# Patient Record
Sex: FEMALE | Race: AMERICAN INDIAN OR ALASKA NATIVE
[De-identification: names, ages, dates, MRNs, and addresses within clinical notes are randomized per-mention and may not be internally consistent; named-entity substitution may affect disease eponyms.]

---

## 2019-09-08 ENCOUNTER — HOSPITAL ENCOUNTER (EMERGENCY)
Dept: HOSPITAL 5 - ED | Age: 29
Discharge: LEFT BEFORE DISCHARGE | End: 2019-09-08
Payer: SELF-PAY

## 2019-09-08 ENCOUNTER — HOSPITAL ENCOUNTER (EMERGENCY)
Dept: HOSPITAL 5 - ED | Age: 29
Discharge: HOME | End: 2019-09-08
Payer: COMMERCIAL

## 2019-09-08 VITALS — SYSTOLIC BLOOD PRESSURE: 119 MMHG | DIASTOLIC BLOOD PRESSURE: 70 MMHG

## 2019-09-08 VITALS — SYSTOLIC BLOOD PRESSURE: 130 MMHG | DIASTOLIC BLOOD PRESSURE: 70 MMHG

## 2019-09-08 DIAGNOSIS — K80.70: Primary | ICD-10-CM

## 2019-09-08 DIAGNOSIS — R10.9: Primary | ICD-10-CM

## 2019-09-08 DIAGNOSIS — Z53.21: ICD-10-CM

## 2019-09-08 DIAGNOSIS — Z79.899: ICD-10-CM

## 2019-09-08 DIAGNOSIS — R11.2: ICD-10-CM

## 2019-09-08 LAB
ALBUMIN SERPL-MCNC: 4.6 G/DL (ref 3.9–5)
ALT SERPL-CCNC: 155 UNITS/L (ref 7–56)
BASOPHILS # (AUTO): 0 K/MM3 (ref 0–0.1)
BASOPHILS NFR BLD AUTO: 1.1 % (ref 0–1.8)
BUN SERPL-MCNC: 14 MG/DL (ref 7–17)
BUN/CREAT SERPL: 18 %
CALCIUM SERPL-MCNC: 10 MG/DL (ref 8.4–10.2)
EOSINOPHIL # BLD AUTO: 0.2 K/MM3 (ref 0–0.4)
EOSINOPHIL NFR BLD AUTO: 3.7 % (ref 0–4.3)
HCT VFR BLD CALC: 38 % (ref 30.3–42.9)
HEMOLYSIS INDEX: 5
HGB BLD-MCNC: 12.5 GM/DL (ref 10.1–14.3)
LYMPHOCYTES # BLD AUTO: 1.9 K/MM3 (ref 1.2–5.4)
LYMPHOCYTES NFR BLD AUTO: 42.6 % (ref 13.4–35)
MCHC RBC AUTO-ENTMCNC: 33 % (ref 30–34)
MCV RBC AUTO: 89 FL (ref 79–97)
MONOCYTES # (AUTO): 0.4 K/MM3 (ref 0–0.8)
MONOCYTES % (AUTO): 9 % (ref 0–7.3)
PLATELET # BLD: 252 K/MM3 (ref 140–440)
RBC # BLD AUTO: 4.28 M/MM3 (ref 3.65–5.03)

## 2019-09-08 PROCEDURE — 96374 THER/PROPH/DIAG INJ IV PUSH: CPT

## 2019-09-08 PROCEDURE — 96372 THER/PROPH/DIAG INJ SC/IM: CPT

## 2019-09-08 PROCEDURE — 76705 ECHO EXAM OF ABDOMEN: CPT

## 2019-09-08 PROCEDURE — 84703 CHORIONIC GONADOTROPIN ASSAY: CPT

## 2019-09-08 PROCEDURE — 85025 COMPLETE CBC W/AUTO DIFF WBC: CPT

## 2019-09-08 PROCEDURE — 36415 COLL VENOUS BLD VENIPUNCTURE: CPT

## 2019-09-08 PROCEDURE — 99283 EMERGENCY DEPT VISIT LOW MDM: CPT

## 2019-09-08 PROCEDURE — 80053 COMPREHEN METABOLIC PANEL: CPT

## 2019-09-08 PROCEDURE — 96361 HYDRATE IV INFUSION ADD-ON: CPT

## 2019-09-08 PROCEDURE — 83690 ASSAY OF LIPASE: CPT

## 2019-09-08 NOTE — ULTRASOUND REPORT
ULTRASOUND ABDOMEN, LIMITED (RIGHT UPPER QUADRANT), 9/8/2019



INDICATION: Right upper quadrant pain. Nausea and vomiting



COMPARISON: None



FINDINGS:



Pancreas: Visualized portion shows no significant abnormality.

Liver: Visualized portions of the liver appear normal.

Gallbladder: The gallbladder contains several small stones, the largest of which measures 1.3 cm. The
re is no evidence of gallbladder wall thickening or pericholecystic fluid. 

Bile ducts: Common Bile Duct is normal in caliber, measuring less than 5 mm.

Free fluid: None.

Additional Findings: None.



IMPRESSION:

1. Several gallstones as described without sonographic evidence to suggest cholecystitis.



Signer Name: Katelynn Reardon MD 

Signed: 9/8/2019 10:27 AM

 Workstation Name: [id number]

## 2019-09-08 NOTE — EMERGENCY DEPARTMENT REPORT
ED Abdominal Pain HPI





- General


Chief Complaint: Abdominal Pain


Stated Complaint: STOMACH PAIN/VOMIT/SOB


Time Seen by Provider: 19 08:56


Source: patient


Mode of arrival: Ambulatory


Limitations: No Limitations





- History of Present Illness


Initial Comments: 





Patient is a 29-year-old  female who is presenting with abdominal 

pain since last night.  Patient states that the pain is severe in the upper 

epigastric area.  She's had small episodes of nausea and vomiting.  She states 

that her symptom present for approximately one week off and on.  She states it 

is worse with eating but cannot remember specifically which foods made the pain 

worse.  Patient actually was received here in the emergency department but left 

AG[facility] AD[facility]ICE after labs were drawn because she had to go home to 

breast-feed her child.  Patient is returned.  Patient denies any fevers chills 

diarrhea cough cold or congestion.


Severity scale (0 -10): 8





- Related Data


                                  Previous Rx's











 Medication  Instructions  Recorded  Last Taken  Type


 


Dicyclomine [Bentyl] 20 mg PO QID #10 tablet 19 Unknown Rx


 


Ondansetron [Zofran Odt] 4 mg PO Q8HR #10 tab.rapdis 19 Unknown Rx


 


oxyCODONE /ACETAMINOPHEN [Percocet 1 tab PO Q6HR PRN #14 tablet 19 Unknown

 Rx





5/325]    











                                    Allergies











Allergy/AdvReac Type Severity Reaction Status Date / Time


 


No Known Allergies Allergy   Unverified 19 06:07














ED Review of Systems


ROS: 


Stated complaint: STOMACH PAIN/VOMIT/SOB


Other details as noted in HPI





Comment: All other systems reviewed and negative





ED Past Medical Hx





- Past Medical History


Previous Medical History?: No





- Surgical History


Past Surgical History?: Yes


Additional Surgical History:  X1





- Social History


Smoking Status: Never Smoker


Substance Use Type: None





- Medications


Home Medications: 


                                Home Medications











 Medication  Instructions  Recorded  Confirmed  Last Taken  Type


 


Dicyclomine [Bentyl] 20 mg PO QID #10 tablet 19  Unknown Rx


 


Ondansetron [Zofran Odt] 4 mg PO Q8HR #10 tab.rapdis 19  Unknown Rx


 


oxyCODONE /ACETAMINOPHEN [Percocet 1 tab PO Q6HR PRN #14 tablet 19  

Unknown Rx





5/325]     














ED Physical Exam





- General


Limitations: No Limitations


General appearance: alert, in distress





- Head


Head exam: Present: atraumatic, normocephalic





- Eye


Eye exam: Present: normal appearance, PERRL, EOMI





- ENT


ENT exam: Present: mucous membranes moist





- Neck


Neck exam: Present: normal inspection





- Respiratory


Respiratory exam: Present: normal lung sounds bilaterally.  Absent: respiratory 

distress, wheezes, rales, rhonchi





- Cardiovascular


Cardiovascular Exam: Present: regular rate, normal rhythm, normal heart sounds. 

Absent: systolic murmur, diastolic murmur, rubs, gallop





- GI/Abdominal


GI/Abdominal exam: Present: soft, tenderness (epigastric and right upper 

quadrant.), guarding, normal bowel sounds.  Absent: distended, rebound, rigid





- Extremities Exam


Extremities exam: Present: normal inspection





- Back Exam


Back exam: Present: normal inspection





- Neurological Exam


Neurological exam: Present: alert, oriented X3





- Psychiatric


Psychiatric exam: Present: normal affect, normal mood





- Skin


Skin exam: Present: warm, dry, intact, normal color.  Absent: rash





ED Course





                                   Vital Signs











  19





  08:50


 


Pulse Rate 61


 


Respiratory 18





Rate 


 


Blood Pressure 127/80


 


O2 Sat by Pulse 92





Oximetry 














ED Medical Decision Making





- Lab Data





Laboratory studies from the patient's previous visit showed she has a normal 

white count 4.5.  Bilirubin is 0.70.  AST and ALTs are 175 and 155 respectively.

 There is elevation of her alkaline phosphatase level at 210.  Electrolytes are 

within normal limits.





- Radiology Data





[facility]


[address] [id number]





Ultrasound Report


Signed





Patient: MARTHA IYNG MR#:


[id number]


: 1990 Acct:[id number]





Age/Sex: 29 / F ADM Date: 19





Loc: ED


Attending Dr:








Ordering Physician: CHARLINE ARNDT MD


Date of Service: 19


Procedure(s): US abdomen limited


Accession Number(s): [id number]





cc: CHARLINE ARNDT MD








ULTRASOUND ABDOMEN, LIMITED (RIGHT UPPER QUADRANT), 2019





INDICATION: Right upper quadrant pain. Nausea and vomiting





COMPARISON: None





FINDINGS:





Pancreas: Visualized portion shows no significant abnormality.


Liver: Visualized portions of the liver appear normal.


Gallbladder: The gallbladder contains several small stones, the largest of which

measures 1.3 cm.


There is no evidence of gallbladder wall thickening or pericholecystic fluid.


Bile ducts: Common Bile Duct is normal in caliber, measuring less than 5 mm.


Free fluid: None.


Additional Findings: None.





IMPRESSION:


1. Several gallstones as described without sonographic evidence to suggest 

cholecystitis.





Signer Name: Katelynn Reardon MD


Signed: 2019 10:27 AM


Workstation Name: [email or web address]








Transcribed By: EB


Dictated By: Katelynn Reardon MD


Electronically Authenticated By: Katelynn Reardon MD


Signed Date/Time: 19 1027











- Medical Decision Making





Patient is a 29-year-old  female who is presenting with upper 

abdominal pain.  Patient states she doesn't remember which foods was noted the 

pain during the week but her last meal was some takis.  As the location of pain 

and the fact that her last meal was fried chips do suspect cholelithiasis or 

cholecystitis.  Ultrasound confirms that the patient does have multiple mobile 

gallstones.  Common bile duct is within normal limits.  Patient's pain was 

controlled here in emergency department.  Patient will be discharged home to 

follow with general surgery.  Patient had a long discussion regarding the types 

of foods she should avoid until she is able to have her gallbladder removed.  

Patient is stable at discharge for discharge at this time.


Critical care attestation.: 


If time is entered above; I have spent that time in minutes in the direct care 

of this critically ill patient, excluding procedure time.








ED Disposition


Clinical Impression: 


 Cholelithiasis, Biliary colic





Disposition: DC-01 TO HOME OR SELFCARE


Is pt being admited?: No


Does the pt Need Aspirin: No


Condition: Stable


Instructions:  Biliary Colic (ED)


Referrals: 


ROBBIN WILKINS MD [Staff Physician] - 3-5 Days


Time of Disposition: 11:17

## 2019-12-24 ENCOUNTER — HOSPITAL ENCOUNTER (EMERGENCY)
Dept: HOSPITAL 5 - ED | Age: 29
LOS: 1 days | Discharge: HOME | End: 2019-12-25
Payer: MEDICAID

## 2019-12-24 PROCEDURE — 84703 CHORIONIC GONADOTROPIN ASSAY: CPT

## 2019-12-24 PROCEDURE — 80053 COMPREHEN METABOLIC PANEL: CPT

## 2019-12-24 PROCEDURE — 85025 COMPLETE CBC W/AUTO DIFF WBC: CPT

## 2019-12-24 PROCEDURE — 99284 EMERGENCY DEPT VISIT MOD MDM: CPT

## 2019-12-24 PROCEDURE — 36415 COLL VENOUS BLD VENIPUNCTURE: CPT

## 2019-12-24 PROCEDURE — 96374 THER/PROPH/DIAG INJ IV PUSH: CPT

## 2019-12-24 PROCEDURE — 96372 THER/PROPH/DIAG INJ SC/IM: CPT

## 2019-12-24 PROCEDURE — 81001 URINALYSIS AUTO W/SCOPE: CPT

## 2019-12-24 PROCEDURE — 96375 TX/PRO/DX INJ NEW DRUG ADDON: CPT

## 2019-12-24 PROCEDURE — 96361 HYDRATE IV INFUSION ADD-ON: CPT
